# Patient Record
Sex: MALE | Race: WHITE | NOT HISPANIC OR LATINO | Employment: UNEMPLOYED | URBAN - NONMETROPOLITAN AREA
[De-identification: names, ages, dates, MRNs, and addresses within clinical notes are randomized per-mention and may not be internally consistent; named-entity substitution may affect disease eponyms.]

---

## 2024-11-25 ENCOUNTER — OFFICE VISIT (OUTPATIENT)
Dept: URGENT CARE | Facility: PHYSICIAN GROUP | Age: 54
End: 2024-11-25
Payer: MEDICAID

## 2024-11-25 VITALS
HEART RATE: 86 BPM | TEMPERATURE: 98 F | SYSTOLIC BLOOD PRESSURE: 148 MMHG | RESPIRATION RATE: 16 BRPM | WEIGHT: 175 LBS | OXYGEN SATURATION: 97 % | DIASTOLIC BLOOD PRESSURE: 92 MMHG

## 2024-11-25 DIAGNOSIS — R03.0 ELEVATED BP WITHOUT DIAGNOSIS OF HYPERTENSION: ICD-10-CM

## 2024-11-25 DIAGNOSIS — M25.511 ACUTE PAIN OF RIGHT SHOULDER: ICD-10-CM

## 2024-11-25 DIAGNOSIS — M19.011 DEGENERATIVE JOINT DISEASE OF RIGHT ACROMIOCLAVICULAR JOINT: ICD-10-CM

## 2024-11-25 RX ORDER — METHOCARBAMOL 500 MG/1
500 TABLET, FILM COATED ORAL 3 TIMES DAILY PRN
Qty: 15 TABLET | Refills: 0 | Status: SHIPPED | OUTPATIENT
Start: 2024-11-25 | End: 2024-11-30

## 2024-11-25 RX ORDER — LIDOCAINE 50 MG/G
2 PATCH TOPICAL EVERY 24 HOURS
Qty: 60 PATCH | Refills: 0 | Status: SHIPPED | OUTPATIENT
Start: 2024-11-25 | End: 2024-12-25

## 2024-11-25 RX ORDER — NAPROXEN 500 MG/1
500 TABLET ORAL 2 TIMES DAILY WITH MEALS
Qty: 30 TABLET | Refills: 0 | Status: SHIPPED | OUTPATIENT
Start: 2024-11-25 | End: 2024-12-10

## 2024-11-25 ASSESSMENT — PAIN SCALES - GENERAL: PAINLEVEL_OUTOF10: 8=MODERATE-SEVERE PAIN

## 2024-11-26 ENCOUNTER — APPOINTMENT (OUTPATIENT)
Dept: RADIOLOGY | Facility: IMAGING CENTER | Age: 54
End: 2024-11-26
Attending: NURSE PRACTITIONER
Payer: MEDICAID

## 2024-11-26 ENCOUNTER — NON-PROVIDER VISIT (OUTPATIENT)
Dept: URGENT CARE | Facility: PHYSICIAN GROUP | Age: 54
End: 2024-11-26
Payer: MEDICAID

## 2024-11-26 DIAGNOSIS — M25.511 ACUTE PAIN OF RIGHT SHOULDER: ICD-10-CM

## 2024-11-26 PROCEDURE — 73030 X-RAY EXAM OF SHOULDER: CPT | Mod: TC,FY,RT | Performed by: RADIOLOGY

## 2024-11-26 NOTE — PROGRESS NOTES
Verbal consent was acquired by the patient to use [employer] ambient listening note generation during this visit     Date: 11/25/24        Chief Complaint   Patient presents with    Shoulder Pain     Right shoulder blade- moving into arm/other shoulder. Did do pull ups a month ago. Feels like a knot          History of Present Illness  The patient is a 54-year-old male who presents for evaluation of a shoulder injury.    He reports experiencing pain in his neck and shoulder, which began approximately a month ago. The discomfort has since spread, particularly noticeable when he coughs or applies pressure to the area. He recalls an incident about a month and a half ago where he slipped during a pull-up exercise and fell directly on his back. He has been using lidocaine for pain relief, but it has not been significantly effective. He is not experiencing any numbness or tingling in his right hand. He has also been using a Lidoderm patch, which provides some relief. He has not had any x-rays taken previously.  He acknowledges that his blood pressure is typically high and occasionally takes medication to manage it. He has been on a prescribed medication for the past six months, but he cannot recall its name.    SOCIAL HISTORY  He smokes. He drinks everyday.         ROS:    No severe shortness of breath   No Cardiac like chest pain, as discussed   As otherwise stated in HPI        Pertinent Medications:   Ed Griffiths  No current outpatient medications on file prior to visit.     No current facility-administered medications on file prior to visit.        Allergies:  Ed Griffiths Penicillins   Problem List:   Ed Griffiths does not have a problem list on file.    Surgical History: No past surgical history on file.    Past Social Hx:Ed Griffiths  reports that he has been smoking cigarettes. He has never used smokeless tobacco. He reports current alcohol use. He reports current drug use. Drugs: Inhaled and Marijuana.     Past  Family Hx: Ed Griffiths family history is not on file.     Problem list, medications, and allergies reviewed by myself today in Epic     Physical Exam:    Vitals:    11/25/24 1733   BP: (!) 148/92   Pulse: 86   Resp: 16   Temp: 36.7 °C (98 °F)   SpO2: 97%               Physical Exam  Vitals and nursing note reviewed.   Constitutional:       General: He is not in acute distress.     Appearance: He is not ill-appearing, toxic-appearing or diaphoretic.   HENT:      Head: Normocephalic and atraumatic.      Right Ear: External ear normal.      Left Ear: External ear normal.      Nose: Nose normal.      Mouth/Throat:      Mouth: Mucous membranes are moist.   Eyes:      Extraocular Movements: Extraocular movements intact.      Conjunctiva/sclera: Conjunctivae normal.      Pupils: Pupils are equal, round, and reactive to light.   Cardiovascular:      Rate and Rhythm: Normal rate.   Pulmonary:      Effort: Pulmonary effort is normal.   Musculoskeletal:      Right shoulder: Tenderness present. No deformity or crepitus. Decreased range of motion. Normal strength. Normal pulse.      Cervical back: Normal range of motion.      Comments: Right trapezius tender with palpation, no ecchymosis, erythema, or swelling.  He has decreased range of motion with extension secondary to pain.  No deformity or crepitus.   Skin:     General: Skin is warm and dry.      Capillary Refill: Capillary refill takes less than 2 seconds.   Neurological:      General: No focal deficit present.      Mental Status: He is alert and oriented to person, place, and time.   Psychiatric:         Mood and Affect: Mood normal.       Physical Exam      Diagnostics:  No results found for this or any previous visit.  Results      Medical Decision making and plan :  1. Acute pain of right shoulder  - DX-SHOULDER 2+ RIGHT; Future  - lidocaine (LIDODERM) 5 % Patch; Place 2 Patches on the skin every 24 hours for 30 days.  Dispense: 60 Patch; Refill: 0  - methocarbamol  (ROBAXIN) 500 MG Tab; Take 1 Tablet by mouth 3 times a day as needed (muscle pain) for up to 5 days.  Dispense: 15 Tablet; Refill: 0  - Slings  - naproxen (NAPROSYN) 500 MG Tab; Take 1 Tablet by mouth 2 times a day with meals for 15 days.  Dispense: 30 Tablet; Refill: 0    2. Elevated BP without diagnosis of hypertension      Pleasant 54 y.o. male presented clinic with:     Assessment & Plan  1. Shoulder injury.  The patient reports a shoulder injury that started about a month ago, which has worsened over the past week. He describes pain that spreads when he coughs or moves. The injury occurred during a fall while doing pull-ups. An x-ray of the shoulder has been ordered to assess for any fractures or other injuries. Robaxin has been prescribed to be taken three times a day as needed, with a supply for five days. A sling has been provided for support. Naproxen 500 mg has been prescribed to be taken twice a day for 15 days, after which he can resume ibuprofen. If a fracture is detected, a referral to an orthopedist will be necessary.    2. Hypertension.  The patient's elevated blood pressure readings indicate hypertension. He reports that his blood pressure is usually high and that he sometimes takes medication for it, although he has not taken it for about six months. He is advised to establish care with a primary care physician to manage his blood pressure. A referral to Dr. Cole, a primary care physician in [facility], has been suggested.    To note patient has recently been released from prison and is living here in [place]. He does not have transportation, but assures me he has a friend who will take him to the [facility] for imaging.  I explained the options here in [facility] are limited, but if he has any change in his condition he is encouraged to go to the emergency department for further evaluation.   I personally reviewed prior external notes and test results pertinent to today's visit. Pt is clinically stable at  today's acute urgent care visit.  Patient appears nontoxic with no acute distress noted. Appropriate for outpatient care at this time. Shared decision-making was utilized with patient for treatment plan. Medication discussed included indication for use and the potential benefits and side effects. Education was provided regarding the aforementioned assessments.  Differential Diagnosis, natural history, and supportive care discussed. All of the patient's questions were answered to their satisfaction at the time of discharge. Patient was encouraged to monitor symptoms closely. Those signs and symptoms which would warrant concern and mandate seeking a higher level of service through the emergency department discussed at length.  Patient stated agreement and understanding of this plan of care.    Disposition:  Home in stable condition.      Attestation       Voice Recognition Disclaimer:  Portions of this document were created using voice recognition software and [employer] technology provided by [employer]. The software does have a chance of producing errors of grammar and possibly content. I have made every reasonable attempt to correct obvious errors, but there may be errors of grammar and possibly content that I did not discover before finalizing the  documentation.    EDILMA Pal.

## 2024-11-26 NOTE — PATIENT INSTRUCTIONS
Muscle Strain  A muscle strain, or pulled muscle, happens when a muscle is stretched beyond its normal length. This can tear some muscle fibers and cause pain.  Usually, it takes 1-2 weeks to heal from a muscle strain. Full healing normally takes 5-6 weeks.  What are the causes?  This condition is caused when a sudden force is placed on a muscle and stretches it too far. This can happen with a fall, while lifting, or during sports.  What increases the risk?  You are more likely to develop a muscle strain if you are an athlete or you do a lot of physical activity.  What are the signs or symptoms?  Pain.  Tenderness.  Bruising.  Swelling.  Trouble using the muscle.  How is this treated?  This condition is first treated with PRICE therapy. This involves:  Protecting your muscle from being injured again.  Resting your injured muscle.  Icing your injured muscle.  Putting pressure (compression) on your injured muscle. This may be done with a splint or elastic bandage.  Raising (elevating) your injured muscle.  Your doctor may also recommend medicine for pain.  Follow these instructions at home:  If you have a splint that can be taken off:  Wear the splint as told by your doctor. Take it off only as told by your doctor.  Check the skin around the splint every day. Tell your doctor if you see problems.  Loosen the splint if your fingers or toes:  Tingle.  Become numb.  Turn cold and blue.  Keep the splint clean.  If the splint is not waterproof:  Do not let it get wet.  Cover it with a watertight covering when you take a bath or a shower.  Managing pain, stiffness, and swelling    If told, put ice on your injured area. To do this:  If you have a removable splint, take it off as told by your doctor.  Put ice in a plastic bag.  Place a towel between your skin and the bag.  Leave the ice on for 20 minutes, 2-3 times a day.  Take off the ice if your skin turns bright red. This is very important. If you cannot feel pain, heat,  or cold, you have a greater risk of damage to the area.  Move your fingers or toes often.  Raise the injured area above the level of your heart while you are sitting or lying down.  Wear an elastic bandage as told by your doctor. Make sure it is not too tight.  General instructions  Take over-the-counter and prescription medicines only as told by your doctor. This may include:  Medicines for pain and swelling that are taken by mouth or put on the skin.  Medicines to help relax your muscles.  Limit your activity. Rest your injured muscle as told by your doctor. Your doctor may say that gentle movements are okay.  If physical therapy was prescribed, do exercises as told by your doctor.  Do not put pressure on any part of the splint until it is fully hardened. This may take many hours.  Do not smoke or use any products that contain nicotine or tobacco. If you need help quitting, ask your doctor.  Ask your doctor when it is safe to drive if you have a splint.  Keep all follow-up visits.  How is this prevented?  Warm up before you exercise. This helps to prevent more muscle strains.  Contact a doctor if:  You have more pain or swelling in the injured area.  Get help right away if:  You have any of these problems in your injured area:  Numbness.  Tingling.  Less strength than normal.  Summary  A muscle strain is an injury that happens when a muscle is stretched beyond normal length.  This condition is first treated with PRICE therapy. This includes protecting, resting, icing, adding pressure, and raising your injury.  Limit your activity. Rest your injured muscle as told by your doctor. Your doctor may say that gentle movements are okay.  Warm up before you exercise. This helps to prevent more muscle strains.  This information is not intended to replace advice given to you by your health care provider. Make sure you discuss any questions you have with your health care provider.  Document Revised: 03/07/2022 Document  Reviewed: 03/07/2022  Elsevier Patient Education © 2023 [employer]

## 2025-04-03 ENCOUNTER — OFFICE VISIT (OUTPATIENT)
Dept: URGENT CARE | Facility: PHYSICIAN GROUP | Age: 55
End: 2025-04-03
Payer: MEDICAID

## 2025-04-03 VITALS
DIASTOLIC BLOOD PRESSURE: 82 MMHG | HEART RATE: 97 BPM | SYSTOLIC BLOOD PRESSURE: 142 MMHG | OXYGEN SATURATION: 97 % | TEMPERATURE: 98.2 F | RESPIRATION RATE: 16 BRPM | WEIGHT: 159 LBS

## 2025-04-03 DIAGNOSIS — K40.90 RIGHT INGUINAL HERNIA: ICD-10-CM

## 2025-04-03 DIAGNOSIS — R68.89 FLU-LIKE SYMPTOMS: ICD-10-CM

## 2025-04-03 LAB
FLUAV RNA SPEC QL NAA+PROBE: NEGATIVE
FLUBV RNA SPEC QL NAA+PROBE: NEGATIVE
RSV RNA SPEC QL NAA+PROBE: NEGATIVE
SARS-COV-2 RNA RESP QL NAA+PROBE: NEGATIVE

## 2025-04-03 PROCEDURE — 3079F DIAST BP 80-89 MM HG: CPT | Performed by: NURSE PRACTITIONER

## 2025-04-03 PROCEDURE — 99214 OFFICE O/P EST MOD 30 MIN: CPT | Performed by: NURSE PRACTITIONER

## 2025-04-03 PROCEDURE — 3077F SYST BP >= 140 MM HG: CPT | Performed by: NURSE PRACTITIONER

## 2025-04-03 PROCEDURE — 87637 SARSCOV2&INF A&B&RSV AMP PRB: CPT | Mod: QW | Performed by: NURSE PRACTITIONER

## 2025-04-03 NOTE — PROGRESS NOTES
Subjective:   Ed Griffiths is a 54 y.o. male who presents for Eye Problem (Eyes last night watering and boogery. )    1) flulike symptoms: Patient reports 5-day history of bilateral red watery eyes with some white/yellow discharge, nasal congestion, cough, and bodyaches.  Patient states that multiple family numbers currently are sick with similar symptoms.  He denies any known fevers, shortness of breath, wheezing, or chest pain.  No over-the-counter medications have been tried at this time.  Patient is a chronic smoker.    2) right inguinal hernia: Patient states that he has had a hernia in his right inguinal area for 7+ years.  He was scheduled for surgery today however they canceled his surgery secondary to flulike symptoms, hypertension, and a dirty drug screen that was positive for methamphetamine.  Patient states that he is eating and drinking well and is having normal bowel movements.  He states that it does continue to reduce.  Does cause him some pain.  He does have pain medication prescribed by his surgeon at [facility].  Patient today is wondering if there is any way he could get an to have surgery today through the urgent care.      Medications, Allergies, and current problem list reviewed today in Epic.     Objective:     BP (!) 168/90   Pulse 97   Temp 36.8 °C (98.2 °F) (Temporal)   Resp 16   Wt 72.1 kg (159 lb)   SpO2 97%     Physical Exam  Vitals reviewed.   Constitutional:       General: He is not in acute distress.     Appearance: Normal appearance. He is ill-appearing. He is not toxic-appearing.   HENT:      Head: Normocephalic.      Right Ear: Tympanic membrane, ear canal and external ear normal.      Left Ear: Tympanic membrane, ear canal and external ear normal.      Nose: Congestion and rhinorrhea present.      Right Turbinates: Swollen.      Left Turbinates: Swollen.      Right Sinus: No maxillary sinus tenderness or frontal sinus tenderness.      Left Sinus: No maxillary sinus tenderness or  frontal sinus tenderness.      Mouth/Throat:      Mouth: Mucous membranes are moist.      Pharynx: No oropharyngeal exudate or posterior oropharyngeal erythema.   Eyes:      General: Lids are normal.      Extraocular Movements: Extraocular movements intact.      Conjunctiva/sclera:      Right eye: Right conjunctiva is injected.      Left eye: Left conjunctiva is injected.      Pupils: Pupils are equal, round, and reactive to light.      Comments: Bilateral watery eyes   Cardiovascular:      Rate and Rhythm: Normal rate and regular rhythm.      Heart sounds: No murmur heard.  Pulmonary:      Effort: Pulmonary effort is normal. No respiratory distress.      Breath sounds: Normal breath sounds. No stridor. No wheezing, rhonchi or rales.   Abdominal:      General: Abdomen is flat.      Palpations: Abdomen is soft.      Tenderness: There is no abdominal tenderness.      Hernia: A hernia is present. Hernia is present in the right inguinal area. There is no hernia in the ventral area or left inguinal area.      Comments: Large right inguinal hernia, is reducible.   Musculoskeletal:         General: Normal range of motion.      Cervical back: Normal range of motion and neck supple.   Lymphadenopathy:      Cervical: No cervical adenopathy.   Skin:     General: Skin is warm and dry.   Neurological:      General: No focal deficit present.      Mental Status: He is alert and oriented to person, place, and time.   Psychiatric:         Mood and Affect: Mood normal.         Behavior: Behavior normal.       Results for orders placed or performed in visit on 04/03/25   POCT CEPHEID COV-2, FLU A/B, RSV - PCR    Collection Time: 04/03/25  2:38 PM   Result Value Ref Range    SARS-CoV-2 by PCR Negative Negative, Invalid    Influenza virus A RNA Negative Negative, Invalid    Influenza virus B, PCR Negative Negative, Invalid    RSV, PCR Negative Negative, Invalid       Assessment/Plan:     Diagnosis and associated orders:     1. Flu-like  symptoms  POCT CEPHEID COV-2, FLU A/B, RSV - PCR      2. Right inguinal hernia           Comments/MDM:     1. Flu-like symptoms  POCT COVID, influenza, RSV testing negative.  HPI physical exam finds are consistent with viral illness.  Lung sounds are clear throughout.  Low suspicion at this time for pneumonia.  Patient does have elevated blood pressure in clinic, he is asymptomatic.  All other vital signs within normal range.  Patient does admit to tobacco use, methamphetamine use, and alcohol.  Encouraged patient to monitor blood pressures closely and follow-up with primary care.  Did advise patient on conservative measures for management of symptoms.  Patient is agreeable to pursue adequate rest, adequate hydration, saltwater gargle and nasal saline and nasal toileting for any symptoms of upper respiratory congestion.  Over-the-counter analgesia and antipyretics on a p.r.n. basis as needed for pain and fever.  Did discuss over-the-counter cough medications and to  follow manufactures dosing and safety guidelines.    Recommended following up in clinic if symptoms acutely worsen or if symptoms/fever persist.    - POCT CEPHEID COV-2, FLU A/B, RSV - PCR    2. Right inguinal hernia  This is an acute condition.  Chronic history of right inguinal hernia.  Patient appears to be upset as his surgery that was scheduled for today was canceled due to hypertension, positive methamphetamine on urine drug screen, and flulike symptoms.  Discussed with patient at length that unfortunately I cannot expedite his surgery as there are no emergent causes today in clinic as his hernia is reducible, no signs of incarcerated bowel.  Did discuss with patient if he feels that further workup is needed such as imaging he may proceed over to the [facility] emergency department for evaluation however unlikely that emergency surgery would be done today.  Stressed with patient the importance of not using methamphetamines, decreasing and/or stopping  his smoking along with alcohol use.  Also discussed hypertension management including the lifestyle modifications as listed above.  If blood pressure remains greater than 140/90 patient should follow-up with primary care to discuss hypertension management and possible medications.  Recommended patient continue following with his surgeon.    Patient was involved with shared decision-making throughout the exam today and verbalizes understanding regards to plan of care, discharge instructions, and follow-up       I have spent 42 minutes on the care of Ed Griffiths.  This includes preparing for the urgent care visit. This time includes review of previous visits/ documents, obtaining HPI, examination and evaluation of patient, ordering and interpretation of labs, imaging, tests, medical management, counseling, education and documentation.             Differential diagnosis, natural history, supportive care, and indications for immediate follow-up discussed.    Advised the patient to follow-up with the primary care physician for recheck, reevaluation, and consideration of further management.    I personally reviewed prior external notes and test results pertinent to today's visit as well as additional imaging and testing completed in clinic today.     Please note that this dictation was created using voice recognition software. I have made a reasonable attempt to correct obvious errors, but I expect that there are errors of grammar and possibly content that I did not discover before finalizing the note.